# Patient Record
Sex: FEMALE | Race: WHITE | Employment: OTHER | URBAN - METROPOLITAN AREA
[De-identification: names, ages, dates, MRNs, and addresses within clinical notes are randomized per-mention and may not be internally consistent; named-entity substitution may affect disease eponyms.]

---

## 2017-03-20 ENCOUNTER — ANESTHESIA EVENT (OUTPATIENT)
Dept: ENDOSCOPY | Age: 62
End: 2017-03-20
Payer: COMMERCIAL

## 2017-03-20 RX ORDER — FENTANYL 50 UG/1
1 PATCH TRANSDERMAL
COMMUNITY
End: 2019-12-05

## 2017-03-21 ENCOUNTER — ANESTHESIA (OUTPATIENT)
Dept: ENDOSCOPY | Age: 62
End: 2017-03-21
Payer: COMMERCIAL

## 2017-03-21 ENCOUNTER — HOSPITAL ENCOUNTER (OUTPATIENT)
Age: 62
Setting detail: OUTPATIENT SURGERY
Discharge: HOME | End: 2017-03-21
Attending: INTERNAL MEDICINE | Admitting: INTERNAL MEDICINE
Payer: COMMERCIAL

## 2017-03-21 VITALS
SYSTOLIC BLOOD PRESSURE: 151 MMHG | BODY MASS INDEX: 32.57 KG/M2 | TEMPERATURE: 98.1 F | WEIGHT: 177 LBS | RESPIRATION RATE: 18 BRPM | HEIGHT: 62 IN | DIASTOLIC BLOOD PRESSURE: 83 MMHG | OXYGEN SATURATION: 95 % | HEART RATE: 73 BPM

## 2017-03-21 PROCEDURE — 76060000032 HC ANESTHESIA 0.5 TO 1 HR: Performed by: INTERNAL MEDICINE

## 2017-03-21 PROCEDURE — 74011250637 HC RX REV CODE- 250/637

## 2017-03-21 PROCEDURE — 74011250636 HC RX REV CODE- 250/636

## 2017-03-21 PROCEDURE — 77030003657 HC NDL SCLER BSC -B: Performed by: INTERNAL MEDICINE

## 2017-03-21 PROCEDURE — 76040000007: Performed by: INTERNAL MEDICINE

## 2017-03-21 PROCEDURE — 74011250636 HC RX REV CODE- 250/636: Performed by: NURSE ANESTHETIST, CERTIFIED REGISTERED

## 2017-03-21 RX ORDER — SODIUM CHLORIDE 0.9 % (FLUSH) 0.9 %
5-10 SYRINGE (ML) INJECTION EVERY 8 HOURS
Status: DISCONTINUED | OUTPATIENT
Start: 2017-03-21 | End: 2017-03-21 | Stop reason: HOSPADM

## 2017-03-21 RX ORDER — FAMOTIDINE 20 MG/1
TABLET, FILM COATED ORAL
Status: COMPLETED
Start: 2017-03-21 | End: 2017-03-21

## 2017-03-21 RX ORDER — SODIUM CHLORIDE, SODIUM LACTATE, POTASSIUM CHLORIDE, CALCIUM CHLORIDE 600; 310; 30; 20 MG/100ML; MG/100ML; MG/100ML; MG/100ML
50 INJECTION, SOLUTION INTRAVENOUS CONTINUOUS
Status: DISCONTINUED | OUTPATIENT
Start: 2017-03-22 | End: 2017-03-21 | Stop reason: HOSPADM

## 2017-03-21 RX ORDER — SODIUM CHLORIDE 0.9 % (FLUSH) 0.9 %
5-10 SYRINGE (ML) INJECTION AS NEEDED
Status: DISCONTINUED | OUTPATIENT
Start: 2017-03-21 | End: 2017-03-21 | Stop reason: HOSPADM

## 2017-03-21 RX ORDER — SODIUM CHLORIDE 9 MG/ML
50 INJECTION, SOLUTION INTRAVENOUS CONTINUOUS
Status: DISCONTINUED | OUTPATIENT
Start: 2017-03-21 | End: 2017-03-21 | Stop reason: HOSPADM

## 2017-03-21 RX ORDER — MAGNESIUM SULFATE 100 %
4 CRYSTALS MISCELLANEOUS AS NEEDED
Status: CANCELLED | OUTPATIENT
Start: 2017-03-21

## 2017-03-21 RX ORDER — ONDANSETRON 2 MG/ML
4 INJECTION INTRAMUSCULAR; INTRAVENOUS ONCE
Status: CANCELLED | OUTPATIENT
Start: 2017-03-21 | End: 2017-03-21

## 2017-03-21 RX ORDER — SODIUM CHLORIDE 0.9 % (FLUSH) 0.9 %
5-10 SYRINGE (ML) INJECTION AS NEEDED
Status: CANCELLED | OUTPATIENT
Start: 2017-03-21

## 2017-03-21 RX ORDER — DULOXETIN HYDROCHLORIDE 60 MG/1
60 CAPSULE, DELAYED RELEASE ORAL DAILY
COMMUNITY
End: 2019-12-05

## 2017-03-21 RX ORDER — FAMOTIDINE 20 MG/1
20 TABLET, FILM COATED ORAL ONCE
Status: COMPLETED | OUTPATIENT
Start: 2017-03-22 | End: 2017-03-21

## 2017-03-21 RX ORDER — DEXTROSE 50 % IN WATER (D50W) INTRAVENOUS SYRINGE
25-50 AS NEEDED
Status: CANCELLED | OUTPATIENT
Start: 2017-03-21

## 2017-03-21 RX ORDER — PROPOFOL 10 MG/ML
INJECTION, EMULSION INTRAVENOUS AS NEEDED
Status: DISCONTINUED | OUTPATIENT
Start: 2017-03-21 | End: 2017-03-21 | Stop reason: HOSPADM

## 2017-03-21 RX ORDER — DULOXETIN HYDROCHLORIDE 30 MG/1
30 CAPSULE, DELAYED RELEASE ORAL DAILY
COMMUNITY
End: 2019-12-05

## 2017-03-21 RX ORDER — PROPOFOL 10 MG/ML
INJECTION, EMULSION INTRAVENOUS
Status: DISCONTINUED | OUTPATIENT
Start: 2017-03-21 | End: 2017-03-21

## 2017-03-21 RX ORDER — INSULIN LISPRO 100 [IU]/ML
INJECTION, SOLUTION INTRAVENOUS; SUBCUTANEOUS ONCE
Status: CANCELLED | OUTPATIENT
Start: 2017-03-21 | End: 2017-03-21

## 2017-03-21 RX ADMIN — PROPOFOL 30 MG: 10 INJECTION, EMULSION INTRAVENOUS at 12:08

## 2017-03-21 RX ADMIN — PROPOFOL 30 MG: 10 INJECTION, EMULSION INTRAVENOUS at 12:06

## 2017-03-21 RX ADMIN — FAMOTIDINE 20 MG: 20 TABLET, FILM COATED ORAL at 11:30

## 2017-03-21 RX ADMIN — PROPOFOL 50 MG: 10 INJECTION, EMULSION INTRAVENOUS at 12:02

## 2017-03-21 RX ADMIN — SODIUM CHLORIDE, SODIUM LACTATE, POTASSIUM CHLORIDE, AND CALCIUM CHLORIDE 50 ML/HR: 600; 310; 30; 20 INJECTION, SOLUTION INTRAVENOUS at 11:26

## 2017-03-21 RX ADMIN — FAMOTIDINE 20 MG: 20 TABLET ORAL at 11:30

## 2017-03-21 RX ADMIN — PROPOFOL 50 MG: 10 INJECTION, EMULSION INTRAVENOUS at 12:03

## 2017-03-21 NOTE — ANESTHESIA POSTPROCEDURE EVALUATION
Post-Anesthesia Evaluation and Assessment    Patient: Ana Matthews MRN: [id number]  SSN: [id number]    YOB: 1955  Age: 64 y.o. Sex: female     VS from flow sheet    Cardiovascular Function/Vital Signs  Visit Vitals    /83 (BP 1 Location: Right arm, BP Patient Position: At rest)    Pulse 73    Temp 36.7 °C (98.1 °F)    Resp 18    Ht 5' 2\" (1.575 m)    Wt 80.3 kg (177 lb)    SpO2 95%    Breastfeeding No    BMI 32.37 kg/m2       Patient is status post MAC anesthesia for Procedure(s):  ESOPHAGOGASTRODUODENOSCOPY and botulium toxin injection. Nausea/Vomiting: None    Postoperative hydration reviewed and adequate. Pain:  Pain Scale 1: Numeric (0 - 10) (03/21/17 1251)  Pain Intensity 1: 0 (03/21/17 1251)   Managed    Neurological Status: At baseline    Mental Status and Level of Consciousness: Arousable    Pulmonary Status:   O2 Device: Room air (03/21/17 1220)   Adequate oxygenation and airway patent    Complications related to anesthesia: None    Post-anesthesia assessment completed.  No concerns    Signed By: Yuli Gold MD     March 21, 2017

## 2017-03-21 NOTE — PROCEDURES
egd      EGD Procedure Note    Patient: Leonora Nielsen MRN: [id number]  SSN: [id number]    YOB: 1955  Age: 64 y.o. Sex: female      Date of Procedure: 3/21/2017      Procedures:  EGD :    HISTORY UPDATE: History and physical has been reviewed. The patient has been examined. There have been no significant clinical changes since the completion of the originally dated History and Physical.    INDICATION: Achalasia     PROCEDURE PERFORMED: EGD    ENDOSCOPIST: Megha Mcdermott MD    ASSISTANT:  Endoscopy Technician-1: Venu Burgos  Endoscopy RN-1: Aristides Garza. Ankit Lane RN    CLASSIFICATION OF PREOPERATIVE RISK: ASA 2 - Patient with mild systemic disease with no functional limitations    ANESTHESIA:  MAC anesthesia    ENDOSCOPE: [id number]                                                                                                              EXTENT OF EXAM: Second portion of the duodenum      DESCRIPTION OF PROCEDURE:   The procedure was discussed with the patient including purpose, risks, benefits and alternatives including but not limited to IV conscious sedation, bleeding, perforation and aspiration and the consent form was signed and witnessed. A safety timeout was performed. Procedure done with MAC. The patients vital signs were monitored at all times including heart rate and rhythm, oxygen saturation, and blood pressure. The patient was then placed into the left lateral decubitus position. The Olympus adult diagnostic endoscope was then passed under direct visualization to the second portion of the duodenum. The endoscope was then slowly withdrawn while closely visualizing the mucosa. In the stomach a retroflexion was performed and gastric fundus and cardia visualized. The scope was then removed. The patient was then transferred to the recovery room. FINDINGS:   Esophagus: The esophageal mucosa was normal with no ulceration, mass or stricture.   There was no evidence of Rabago's esophagus or reflux esophagitis . Hypertensive LES, 25 U of Botox injected in four quadrants of LES 1 cm above the GE junction. Z line at 35 cm. Stomach: The gastric mucosa was normal with no ulceration, mass, stricture. S/P RNYGBP. Small bowel : The Small bowel mucosa  mucosa was normal with no ulceration, mass, stricture and no evidence of villous atrophy. EBL: None      SPECIMENS: None    IMPRESSION: Hypertensive LES, 25 U of Botox injected in four quadrants of LES 1 cm above the GE junction. Z line at 35 cm. Normal RNYGBP anatomy       PLAN 1: Discharge when sedation criteria are met. 2.  Resume regular Diet as tolerated. Follow Up:  As scheduled.       Susie Perkins MD  3/21/2017

## 2017-03-21 NOTE — H&P
History and Physical    Yamel Hubbard  1955  [id number]  [id number]    Pre-Procedure Diagnosis:  r13.10 dysphagia      Evaluation of past illnesses, surgeries, or injuries:   YES  Past Medical History:   Diagnosis Date    Arthritis     Back injury     june 2011    Back pain     Bell's palsy     Disorder of metabolism     states \"slow\"    Dysphagia     Esophageal stricture     states stretched 2x    GERD (gastroesophageal reflux disease)     Ill-defined condition     coddle block 2015    Leg pain, right     Lumbar stenosis     Migraines     Neck injury     Osteoarthritis     Shingles      Past Surgical History:   Procedure Laterality Date    HX CERVICAL FUSION      HX CHOLECYSTECTOMY      06/2005    HX GASTRIC BYPASS      3/27/2003    HX GI      HX GYN      HX LUMBAR LAMINECTOMY      HX ORTHOPAEDIC  05/23/2016    right foot surgery    HX PARTIAL HYSTERECTOMY      09/29/10       Allergies:  No Known Allergies    Previous reactions to sedation/analgesia? NO    Review of current medications, supplement, herbals and nutraceuticals complete:  YES  Current Facility-Administered Medications   Medication Dose Route Frequency Provider Last Rate Last Dose    [START ON 3/22/2017] lactated ringers infusion  50 mL/hr IntraVENous CONTINUOUS Susie Shaper, CRNA 50 mL/hr at 03/21/17 1126 50 mL/hr at 03/21/17 1126    0.9% sodium chloride infusion  50 mL/hr IntraVENous CONTINUOUS Yana Yu MD        sodium chloride (NS) flush 5-10 mL  5-10 mL IntraVENous Q8H Yana Yu MD        sodium chloride (NS) flush 5-10 mL  5-10 mL IntraVENous PRN Yana Yu MD        onabotulinumtoxinA (BOTOX) injection 100 Units  100 Units IntraMUSCular ONCE Yana Yu MD              Pertinent labs reviewed? YES    History of substance abuse?   NO  Family History   Problem Relation Age of Onset    Hypertension Mother     Diabetes Father     Hypertension Father     Heart Disease Father  Stroke Father     Lung Disease Father      copd     Social History     Social History    Marital status:      Spouse name: N/A    Number of children: N/A    Years of education: N/A     Occupational History    Not on file.      Social History Main Topics    Smoking status: Former Smoker     Quit date: 1/1/2000    Smokeless tobacco: Not on file    Alcohol use 0.5 oz/week     1 Standard drinks or equivalent per week      Comment: once a month    Drug use: No    Sexual activity: Not on file     Other Topics Concern    Not on file     Social History Narrative       Cardiac Status:  WNL    Mental Status:  WNL     Pulmonary Status:  WNL    NPO:  5-8    Assessment/Impression: Achalasia of esophagus     Plan of treatment: EGD with botox injection        Yana Yu MD  3/21/2017  11:45 AM

## 2017-03-21 NOTE — IP AVS SNAPSHOT
[address] [place] [address] [address] 
[phone] Patient: Svetlana Morataya MRN: [id number] :1955 You are allergic to the following No active allergies Recent Documentation Height Weight Breastfeeding? BMI OB Status Smoking Status 1.575 m 80.3 kg No 32.37 kg/m2 Hysterectomy Former Smoker Emergency Contacts Name Discharge Info Relation Home Work Mobile [facility] DISCHARGE CAREGIVER [3] Spouse [3] [phone] Radha Rosado N/A  AT THIS TIME [6] Daughter [21] [phone] Kirill Hackett  Daughter [21] [phone] About your hospitalization You were admitted on:  2017 [facility] You were discharged on:  2017 Unit phone number:  [phone] Why you were hospitalized Your primary diagnosis was:  Not on File Providers Seen During Your Hospitalizations Provider Role Specialty Primary office phone Akash Ross MD Attending Provider Gastroenterology [phone] Your Primary Care Physician (PCP) Primary Care Physician Office Phone Office Fax Julesdemetria Quinones [phone] Follow-up Information Follow up With Details Comments Contact Info James Teran MD   [address] [address] 
[phone] Venu Yu MD In 1 month  [address] [facility] [address] [id number] [phone] Current Discharge Medication List  
  
CONTINUE these medications which have NOT CHANGED Dose & Instructions Dispensing Information Comments Morning Noon Evening Bedtime  
 cyclobenzaprine 5 mg tablet Commonly known as:  FLEXERIL Your last dose was: Your next dose is:    
   
   
 Dose:  10 mg Take 10 mg by mouth nightly. Refills:  0 * CYMBALTA 60 mg capsule Generic drug:  DULoxetine Your last dose was: Your next dose is:    
   
   
 Dose:  60 mg Take 60 mg by mouth daily. Refills:  0  
     
   
   
   
  
 * CYMBALTA 30 mg capsule Generic drug:  DULoxetine Your last dose was: Your next dose is:    
   
   
 Dose:  30 mg Take 30 mg by mouth daily. Refills:  0  
     
   
   
   
  
 ergocalciferol 50,000 unit capsule Commonly known as:  ERGOCALCIFEROL Your last dose was: Your next dose is:    
   
   
 Dose:  37681 Units Take 50,000 Units by mouth every seven (7) days. Refills:  0  
     
   
   
   
  
 fentaNYL 50 mcg/hr PATCH Commonly known as:  Magan Lax Your last dose was: Your next dose is:    
   
   
 Dose:  1 Patch 1 Patch by TransDERmal route every seventy-two (72) hours. Refills:  0  
     
   
   
   
  
 gabapentin 100 mg capsule Commonly known as:  NEURONTIN Your last dose was: Your next dose is:    
   
   
 Dose:  300 mg Take 300 mg by mouth every evening. Refills:  0  
     
   
   
   
  
 metFORMIN 500 mg tablet Commonly known as:  GLUCOPHAGE Your last dose was: Your next dose is:    
   
   
 Dose:  500 mg Take 500 mg by mouth two (2) times daily (with meals). Takes for \"slow\" metabolism; pt to check with md if needed to be on hold prior to surgery Refills:  0 MULTIVITAMIN PO Your last dose was: Your next dose is:    
   
   
 Dose:  1 Tab Take 1 Tab by mouth daily. Uses gummy vit with calcium Refills:  0  
     
   
   
   
  
 nadolol 40 mg tablet Commonly known as:  CORGARD Your last dose was: Your next dose is:    
   
   
 Dose:  40 mg Take 40 mg by mouth every evening. Refills:  0  
     
   
   
   
  
 oxyCODONE IR 15 mg immediate release tablet Commonly known as:  OXY-IR Your last dose was: Your next dose is:    
   
   
 Dose:  15 mg Take 15 mg by mouth. States take 10 pills /day Refills:  0 phentermine 37.5 mg capsule Your last dose was: Your next dose is:    
   
   
 Dose:  37.5 mg Take 37.5 mg by mouth every morning. Pt instructed to check with md if needed to be onhold   prior to surgery Refills:  0 PROTONIX 40 mg tablet Generic drug:  pantoprazole Your last dose was: Your next dose is:    
   
   
 Dose:  40 mg Take 40 mg by mouth daily. Refills:  0  
     
   
   
   
  
 simvastatin 20 mg tablet Commonly known as:  ZOCOR Your last dose was: Your next dose is: Take  by mouth nightly. Refills:  0 VALTREX 500 mg tablet Generic drug:  valACYclovir Your last dose was: Your next dose is:    
   
   
 Dose:  1000 mg Take 1,000 mg by mouth every evening. Refills:  0  
     
   
   
   
  
 * Notice: This list has 2 medication(s) that are the same as other medications prescribed for you. Read the directions carefully, and ask your doctor or other care provider to review them with you. Discharge Instructions EGD DISCHARGE INSTRUCTIONS You have just had an examination of your esophagus (swallowing tube), stomach and duodenum (the first part of your small intestine) and of your colon (large intestine). The medication given to you during your procedure will be acting in your body for the next 12 hours, gradually wearing off. Your face may be flushed, skin may be warm and sweaty, you might feel a little bloated or nauseated and sleepy. 1. For the next 12 hours you SHOULD NOT: 
 
a. Drive, operate any machinery. b. Drink alcohol. 
c. Engage in activities that require mental sharpness or manual dexterity such as cooking. d. Take any medications other than prescribed by a physician. e. Make any legal or financial decisions. 2. Call this office immediately, if: 
 
a. Onset of new or increase of ever rectal bleeding. 
b. Fever > 101 
c. Increased abdominal pain Additional instructions: 
 
a. Diet as recommended 
b. Due to risk of dehydration after colon prep and sedation, avoid extended periods of time outdoors if the day is hot and humid. c. If biopsies were taken, you will receive a post card or telephone call with results of your biopsies and suggestion for your next colonoscopy. Please allow us at least 3 weeks to get back with you about your results. If we have not contacted you by the, please call us for results. Ph# [phone] 
d. If you had polyp(s) removed DO NOT TAKE NSAIDS (Aspirin, Advil, Aleve, Naproxyn, Ibuprofen, etc) for 2 weeks. Tylenol is OK to use. DISCHARGE SUMMARY from Nurse The following personal items are in your possession at time of discharge: 
 
Dental Appliances: None Visual Aid: Glasses PATIENT INSTRUCTIONS: 
 
After general anesthesia or intravenous sedation, for 24 hours or while taking prescription Narcotics: · Limit your activities · Do not drive and operate hazardous machinery · Do not make important personal or business decisions · Do  not drink alcoholic beverages · If you have not urinated within 8 hours after discharge, please contact your surgeon on call. Report the following to your surgeon: 
· Excessive pain, swelling, redness or odor of or around the surgical area · Temperature over 100.5 · Nausea and vomiting lasting longer than 4 hours or if unable to take medications · Any signs of decreased circulation or nerve impairment to extremity: change in color, persistent  numbness, tingling, coldness or increase pain · Any questions What to do at Home: These are general instructions for a healthy lifestyle: No smoking/ No tobacco products/ Avoid exposure to second hand smoke Surgeon General's Warning:  Quitting smoking now greatly reduces serious risk to your health. Obesity, smoking, and sedentary lifestyle greatly increases your risk for illness A healthy diet, regular physical exercise & weight monitoring are important for maintaining a healthy lifestyle You may be retaining fluid if you have a history of heart failure or if you experience any of the following symptoms:  Weight gain of 3 pounds or more overnight or 5 pounds in a week, increased swelling in our hands or feet or shortness of breath while lying flat in bed. Please call your doctor as soon as you notice any of these symptoms; do not wait until your next office visit. Recognize signs and symptoms of STROKE: 
 
F-face looks uneven A-arms unable to move or move unevenly S-speech slurred or non-existent T-time-call 911 as soon as signs and symptoms begin-DO NOT go Back to bed or wait to see if you get better-TIME IS BRAIN. Warning Signs of HEART ATTACK Call 911 if you have these symptoms: 
? Chest discomfort. Most heart attacks involve discomfort in the center of the chest that lasts more than a few minutes, or that goes away and comes back. It can feel like uncomfortable pressure, squeezing, fullness, or pain. ? Discomfort in other areas of the upper body. Symptoms can include pain or discomfort in one or both arms, the back, neck, jaw, or stomach. ? Shortness of breath with or without chest discomfort. ? Other signs may include breaking out in a cold sweat, nausea, or lightheadedness. Don't wait more than five minutes to call [address]! Fast action can save your life. Calling 911 is almost always the fastest way to get lifesaving treatment. Emergency Medical Services staff can begin treatment when they arrive  up to an hour sooner than if someone gets to the hospital by car. The discharge information has been reviewed with the patient. The patient verbalized understanding. Discharge medications reviewed with the patient and appropriate educational materials and side effects teaching were provided. Patient armband removed and given to patient to take home. Patient was informed of the privacy risks if armband lost or stolen Discharge Orders None Introducing [facility]! [employer] introduces Quidsi patient portal. Now you can access parts of your medical record, email your doctor's office, and request medication refills online. 1. In your internet browser, go to [email or web address] 2. Click on the First Time User? Click Here link in the Sign In box. You will see the New Member Sign Up page. 3. Enter your Quidsi Access Code exactly as it appears below. You will not need to use this code after youve completed the sign-up process. If you do not sign up before the expiration date, you must request a new code. · Quidsi Access Code: [email or web address] Expires: 5/22/2017  3:08 PM 
 
4. Enter the last four digits of your Social Security Number (xxxx) and Date of Birth (mm/dd/yyyy) as indicated and click Submit. You will be taken to the next sign-up page. 5. Create a Quidsi ID. This will be your Quidsi login ID and cannot be changed, so think of one that is secure and easy to remember. 6. Create a Quidsi password. You can change your password at any time. 7. Enter your Password Reset Question and Answer. This can be used at a later time if you forget your password. 8. Enter your e-mail address. You will receive e-mail notification when new information is available in [address] 9. Click Sign Up. You can now view and download portions of your medical record. 10. Click the Download Summary menu link to download a portable copy of your medical information. If you have questions, please visit the Frequently Asked Questions section of the MyChart website. Remember, MyChart is NOT to be used for urgent needs. For medical emergencies, dial 911. Now available from your iPhone and Android! General Information Please provide this summary of care documentation to your next provider. Patient Signature:  ____________________________________________________________ Date:  ____________________________________________________________  
  
[facility] Provider Signature:  ____________________________________________________________ Date:  ____________________________________________________________

## 2017-03-21 NOTE — DISCHARGE INSTRUCTIONS
EGD DISCHARGE INSTRUCTIONS    You have just had an examination of your esophagus (swallowing tube), stomach and duodenum (the first part of your small intestine) and of your colon (large intestine). The medication given to you during your procedure will be acting in your body for the next 12 hours, gradually wearing off. Your face may be flushed, skin may be warm and sweaty, you might feel a little bloated or nauseated and sleepy. 1. For the next 12 hours you SHOULD NOT:    a. Drive, operate any machinery. b. Drink alcohol.  c. Engage in activities that require mental sharpness or manual dexterity such as cooking. d. Take any medications other than prescribed by a physician.  e. Make any legal or financial decisions. 2. Call this office immediately, if:    a. Onset of new or increase of ever rectal bleeding.  b. Fever > 101  c. Increased abdominal pain    Additional instructions:    a. Diet as recommended  b. Due to risk of dehydration after colon prep and sedation, avoid extended periods of time outdoors if the day is hot and humid. c. If biopsies were taken, you will receive a post card or telephone call with results of your biopsies and suggestion for your next colonoscopy. Please allow us at least 3 weeks to get back with you about your results. If we have not contacted you by the, please call us for results. Ph# [phone]  d. If you had polyp(s) removed DO NOT TAKE NSAIDS (Aspirin, Advil, Aleve, Naproxyn, Ibuprofen, etc) for 2 weeks. Tylenol is OK to use.       DISCHARGE SUMMARY from Nurse    The following personal items are in your possession at time of discharge:    Dental Appliances: None  Visual Aid: Glasses                            PATIENT INSTRUCTIONS:    After general anesthesia or intravenous sedation, for 24 hours or while taking prescription Narcotics:  · Limit your activities  · Do not drive and operate hazardous machinery  · Do not make important personal or business decisions  · Do  not drink alcoholic beverages  · If you have not urinated within 8 hours after discharge, please contact your surgeon on call. Report the following to your surgeon:  · Excessive pain, swelling, redness or odor of or around the surgical area  · Temperature over 100.5  · Nausea and vomiting lasting longer than 4 hours or if unable to take medications  · Any signs of decreased circulation or nerve impairment to extremity: change in color, persistent  numbness, tingling, coldness or increase pain  · Any questions        What to do at Home:        These are general instructions for a healthy lifestyle:    No smoking/ No tobacco products/ Avoid exposure to second hand smoke    Surgeon General's Warning:  Quitting smoking now greatly reduces serious risk to your health. Obesity, smoking, and sedentary lifestyle greatly increases your risk for illness    A healthy diet, regular physical exercise & weight monitoring are important for maintaining a healthy lifestyle    You may be retaining fluid if you have a history of heart failure or if you experience any of the following symptoms:  Weight gain of 3 pounds or more overnight or 5 pounds in a week, increased swelling in our hands or feet or shortness of breath while lying flat in bed. Please call your doctor as soon as you notice any of these symptoms; do not wait until your next office visit. Recognize signs and symptoms of STROKE:    F-face looks uneven    A-arms unable to move or move unevenly    S-speech slurred or non-existent    T-time-call 911 as soon as signs and symptoms begin-DO NOT go       Back to bed or wait to see if you get better-TIME IS BRAIN. Warning Signs of HEART ATTACK     Call 911 if you have these symptoms:   Chest discomfort. Most heart attacks involve discomfort in the center of the chest that lasts more than a few minutes, or that goes away and comes back.  It can feel like uncomfortable pressure, squeezing, fullness, or pain.  Discomfort in other areas of the upper body. Symptoms can include pain or discomfort in one or both arms, the back, neck, jaw, or stomach.  Shortness of breath with or without chest discomfort.  Other signs may include breaking out in a cold sweat, nausea, or lightheadedness. Don't wait more than five minutes to call 911 - MINUTES MATTER! Fast action can save your life. Calling 911 is almost always the fastest way to get lifesaving treatment. Emergency Medical Services staff can begin treatment when they arrive -- up to an hour sooner than if someone gets to the hospital by car. The discharge information has been reviewed with the patient. The patient verbalized understanding. Discharge medications reviewed with the patient and appropriate educational materials and side effects teaching were provided. Patient armband removed and given to patient to take home.   Patient was informed of the privacy risks if armband lost or stolen

## 2017-03-21 NOTE — ANESTHESIA PREPROCEDURE EVALUATION
Anesthetic History   No history of anesthetic complications            Review of Systems / Medical History  Patient summary reviewed and pertinent labs reviewed    Pulmonary                   Neuro/Psych   Within defined limits           Cardiovascular                       GI/Hepatic/Renal     GERD           Endo/Other        Arthritis     Other Findings   Comments: Current Smoker? NO       Elective Surgery? Yes       Abstained from smoking 24 hours prior to anesthesia? N/A    Risk Factors for Postoperative nausea/vomiting:       History of postoperative nausea/vomiting? NO       Female? YES       Motion sickness? NO       Intended opioid administration for postoperative analgesia?   NO           Physical Exam    Airway  Mallampati: III  TM Distance: 4 - 6 cm  Neck ROM: decreased range of motion   Mouth opening: Diminished (comment)     Cardiovascular    Rhythm: irregular  Rate: normal         Dental    Dentition: Poor dentition     Pulmonary  Breath sounds clear to auscultation               Abdominal  GI exam deferred       Other Findings            Anesthetic Plan    ASA: 2  Anesthesia type: MAC            Anesthetic plan and risks discussed with: Patient

## 2017-09-05 ENCOUNTER — ANESTHESIA EVENT (OUTPATIENT)
Dept: ENDOSCOPY | Age: 62
End: 2017-09-05
Payer: COMMERCIAL

## 2017-09-06 ENCOUNTER — HOSPITAL ENCOUNTER (OUTPATIENT)
Age: 62
Setting detail: OUTPATIENT SURGERY
Discharge: HOME | End: 2017-09-06
Attending: INTERNAL MEDICINE | Admitting: INTERNAL MEDICINE
Payer: COMMERCIAL

## 2017-09-06 ENCOUNTER — ANESTHESIA (OUTPATIENT)
Dept: ENDOSCOPY | Age: 62
End: 2017-09-06
Payer: COMMERCIAL

## 2017-09-06 VITALS
TEMPERATURE: 97 F | HEART RATE: 85 BPM | DIASTOLIC BLOOD PRESSURE: 67 MMHG | RESPIRATION RATE: 16 BRPM | BODY MASS INDEX: 34.78 KG/M2 | WEIGHT: 189 LBS | HEIGHT: 62 IN | OXYGEN SATURATION: 92 % | SYSTOLIC BLOOD PRESSURE: 123 MMHG

## 2017-09-06 PROCEDURE — 74011250636 HC RX REV CODE- 250/636

## 2017-09-06 PROCEDURE — 77030003657 HC NDL SCLER BSC -B: Performed by: INTERNAL MEDICINE

## 2017-09-06 PROCEDURE — 74011000250 HC RX REV CODE- 250

## 2017-09-06 PROCEDURE — 74011250636 HC RX REV CODE- 250/636: Performed by: INTERNAL MEDICINE

## 2017-09-06 PROCEDURE — 76040000019: Performed by: INTERNAL MEDICINE

## 2017-09-06 PROCEDURE — 74011250636 HC RX REV CODE- 250/636: Performed by: NURSE ANESTHETIST, CERTIFIED REGISTERED

## 2017-09-06 PROCEDURE — 76060000031 HC ANESTHESIA FIRST 0.5 HR: Performed by: INTERNAL MEDICINE

## 2017-09-06 RX ORDER — OXYCODONE HYDROCHLORIDE 20 MG/1
TABLET ORAL
Refills: 0 | COMMUNITY
Start: 2017-07-04 | End: 2019-12-05

## 2017-09-06 RX ORDER — SODIUM CHLORIDE 0.9 % (FLUSH) 0.9 %
5-10 SYRINGE (ML) INJECTION AS NEEDED
Status: DISCONTINUED | OUTPATIENT
Start: 2017-09-06 | End: 2017-09-06 | Stop reason: HOSPADM

## 2017-09-06 RX ORDER — BUPROPION HYDROCHLORIDE 150 MG/1
TABLET, EXTENDED RELEASE ORAL
Refills: 3 | COMMUNITY
Start: 2017-07-30 | End: 2019-12-05

## 2017-09-06 RX ORDER — EFLORNITHINE HYDROCHLORIDE 139 MG/G
CREAM TOPICAL
Refills: 2 | COMMUNITY
Start: 2017-08-14 | End: 2019-12-05

## 2017-09-06 RX ORDER — LINACLOTIDE 290 UG/1
CAPSULE, GELATIN COATED ORAL
Refills: 0 | COMMUNITY
Start: 2017-08-09 | End: 2019-12-05

## 2017-09-06 RX ORDER — SODIUM CHLORIDE 0.9 % (FLUSH) 0.9 %
5-10 SYRINGE (ML) INJECTION EVERY 8 HOURS
Status: DISCONTINUED | OUTPATIENT
Start: 2017-09-06 | End: 2017-09-06 | Stop reason: HOSPADM

## 2017-09-06 RX ORDER — PROPOFOL 10 MG/ML
INJECTION, EMULSION INTRAVENOUS
Status: DISCONTINUED | OUTPATIENT
Start: 2017-09-06 | End: 2017-09-06 | Stop reason: HOSPADM

## 2017-09-06 RX ORDER — LACTULOSE 10 G/15ML
SOLUTION ORAL; RECTAL
Refills: 5 | Status: ON HOLD | COMMUNITY
Start: 2017-06-30 | End: 2017-09-06

## 2017-09-06 RX ORDER — NALOXEGOL OXALATE 25 MG/1
TABLET, FILM COATED ORAL
Refills: 5 | COMMUNITY
Start: 2017-07-11 | End: 2019-12-05

## 2017-09-06 RX ORDER — METRONIDAZOLE 7.5 MG/G
GEL TOPICAL
COMMUNITY
Start: 2016-02-26 | End: 2019-12-05

## 2017-09-06 RX ORDER — FAMOTIDINE 20 MG/1
20 TABLET, FILM COATED ORAL ONCE
Status: DISCONTINUED | OUTPATIENT
Start: 2017-09-07 | End: 2017-09-06 | Stop reason: HOSPADM

## 2017-09-06 RX ORDER — SODIUM CHLORIDE 9 MG/ML
25 INJECTION, SOLUTION INTRAVENOUS CONTINUOUS
Status: CANCELLED | OUTPATIENT
Start: 2017-09-06 | End: 2017-09-06

## 2017-09-06 RX ORDER — PROPOFOL 10 MG/ML
INJECTION, EMULSION INTRAVENOUS AS NEEDED
Status: DISCONTINUED | OUTPATIENT
Start: 2017-09-06 | End: 2017-09-06 | Stop reason: HOSPADM

## 2017-09-06 RX ORDER — SODIUM CHLORIDE 0.9 % (FLUSH) 0.9 %
5-10 SYRINGE (ML) INJECTION AS NEEDED
Status: CANCELLED | OUTPATIENT
Start: 2017-09-06 | End: 2017-09-06

## 2017-09-06 RX ORDER — SODIUM CHLORIDE 9 MG/ML
INJECTION INTRAMUSCULAR; INTRAVENOUS; SUBCUTANEOUS
Status: DISCONTINUED
Start: 2017-09-06 | End: 2017-09-06 | Stop reason: HOSPADM

## 2017-09-06 RX ORDER — SODIUM CHLORIDE, SODIUM LACTATE, POTASSIUM CHLORIDE, CALCIUM CHLORIDE 600; 310; 30; 20 MG/100ML; MG/100ML; MG/100ML; MG/100ML
50 INJECTION, SOLUTION INTRAVENOUS CONTINUOUS
Status: DISCONTINUED | OUTPATIENT
Start: 2017-09-07 | End: 2017-09-06 | Stop reason: HOSPADM

## 2017-09-06 RX ORDER — LIDOCAINE HYDROCHLORIDE 20 MG/ML
INJECTION, SOLUTION EPIDURAL; INFILTRATION; INTRACAUDAL; PERINEURAL AS NEEDED
Status: DISCONTINUED | OUTPATIENT
Start: 2017-09-06 | End: 2017-09-06 | Stop reason: HOSPADM

## 2017-09-06 RX ORDER — SODIUM CHLORIDE 0.9 % (FLUSH) 0.9 %
5-10 SYRINGE (ML) INJECTION EVERY 8 HOURS
Status: CANCELLED | OUTPATIENT
Start: 2017-09-06 | End: 2017-09-06

## 2017-09-06 RX ADMIN — ONABOTULINUMTOXINA 100 UNITS: 100 INJECTION, POWDER, LYOPHILIZED, FOR SOLUTION INTRADERMAL; INTRAMUSCULAR at 14:18

## 2017-09-06 RX ADMIN — PROPOFOL 200 MCG/KG/MIN: 10 INJECTION, EMULSION INTRAVENOUS at 14:12

## 2017-09-06 RX ADMIN — SODIUM CHLORIDE, SODIUM LACTATE, POTASSIUM CHLORIDE, AND CALCIUM CHLORIDE 50 ML/HR: 600; 310; 30; 20 INJECTION, SOLUTION INTRAVENOUS at 13:25

## 2017-09-06 RX ADMIN — PROPOFOL 50 MG: 10 INJECTION, EMULSION INTRAVENOUS at 14:12

## 2017-09-06 RX ADMIN — LIDOCAINE HYDROCHLORIDE 40 MG: 20 INJECTION, SOLUTION EPIDURAL; INFILTRATION; INTRACAUDAL; PERINEURAL at 14:12

## 2017-09-06 NOTE — ANESTHESIA PREPROCEDURE EVALUATION
Anesthetic History   No history of anesthetic complications            Review of Systems / Medical History  Patient summary reviewed and pertinent labs reviewed    Pulmonary  Within defined limits                 Neuro/Psych   Within defined limits           Cardiovascular                  Exercise tolerance: >4 METS     GI/Hepatic/Renal  Within defined limits   GERD           Endo/Other        Obesity and arthritis     Other Findings   Comments:   Risk Factors for Postoperative nausea/vomiting:       History of postoperative nausea/vomiting? NO       Female? YES       Motion sickness? NO       Intended opioid administration for postoperative analgesia? NO      Smoking Abstinence  Current Smoker? NO  Elective Surgery? YES  Seen preoperatively by anesthesiologist or proxy prior to day of surgery? YES  Pt abstained from smoking 24 hours prior to anesthesia?  YES               Physical Exam    Airway  Mallampati: II  TM Distance: 4 - 6 cm  Neck ROM: decreased range of motion   Mouth opening: Normal     Cardiovascular    Rhythm: regular  Rate: normal         Dental  No notable dental hx       Pulmonary  Breath sounds clear to auscultation               Abdominal  GI exam deferred       Other Findings            Anesthetic Plan    ASA: 3  Anesthesia type: MAC          Induction: Intravenous  Anesthetic plan and risks discussed with: Patient

## 2017-09-06 NOTE — PROCEDURES
EGD Procedure Note    Patient: Chaz Neumann MRN: [id number]  SSN: [id number]    YOB: 1955  Age: 64 y.o. Sex: female      Date of Procedure: 9/6/2017      Procedures:  EGD :    HISTORY UPDATE: History and physical has been reviewed. The patient has been examined. There have been no significant clinical changes since the completion of the originally dated History and Physical.    INDICATION:  Achalasia of esopghagus    PROCEDURE PERFORMED: EGD    ENDOSCOPIST: Candido Chin MD    ASSISTANT:  Endoscopy Technician-1: Jeff Blas  Endoscopy RN-1: Brittanie Milian RN    CLASSIFICATION OF PREOPERATIVE RISK: ASA 2 - Patient with mild systemic disease with no functional limitations    ANESTHESIA:  MAC anesthesia    ENDOSCOPE: [id number]                                                                                                              EXTENT OF EXAM: Second portion of the duodenum      DESCRIPTION OF PROCEDURE:   The procedure was discussed with the patient including purpose, risks, benefits and alternatives including but not limited to IV conscious sedation, bleeding, perforation and aspiration and the consent form was signed and witnessed. A safety timeout was performed. Procedure done with MAC. The patients vital signs were monitored at all times including heart rate and rhythm, oxygen saturation, and blood pressure. The patient was then placed into the left lateral decubitus position. The Olympus adult diagnostic endoscope was then passed under direct visualization to the second portion of the duodenum. The endoscope was then slowly withdrawn while closely visualizing the mucosa. In the stomach a retroflexion was performed and gastric fundus and cardia visualized. The scope was then removed. The patient was then transferred to the recovery room. FINDINGS:   Esophagus: The esophageal mucosa was normal with no ulceration, mass or stricture.   There was no evidence of Rabago's esophagus or reflux esophagitis. 25 U of botox injected into four quadrants of the lower esophagus 1 cm above the GE junction. Z line at 35 cm. Stomach: S/P RNYGBP. The gastric pouch mucosa was normal with no ulceration, mass, stricture. Anastamosis looks normal     Small bowel : The Small bowel  mucosa was normal with no ulceration, mass. Stricture up to 50 cm. EBL: None      SPECIMENS: None    IMPRESSION: Normal RNYGBP anatomy. 25 U of botox injected into four quadrants of the lower esophagus 1 cm above the GE junction. Z line at 35 cm. PLAN 1: Discharge when sedation criteria are met. 2.  Resume regular Diet as tolerated. Follow Up:  As scheduled.       Reyes Chough, MD  9/6/2017

## 2017-09-06 NOTE — IP AVS SNAPSHOT
[address] [address] [place] 
[phone] Patient: Farideh Vo MRN: [id number] :1955 You are allergic to the following No active allergies Recent Documentation Height Weight BMI OB Status Smoking Status 1.575 m 85.7 kg 34.57 kg/m2 Hysterectomy Former Smoker Emergency Contacts Name Discharge Info Relation Home Work Mobile [facility] DISCHARGE CAREGIVER [3] Spouse [3] [phone] Radha Rosado N/A  AT THIS TIME [6] Daughter [21] [phone] Urbano Jones  Daughter [21] [phone] About your hospitalization You were admitted on:  2017 [facility] You were discharged on:  2017 Unit phone number:  [phone] Why you were hospitalized Your primary diagnosis was:  Not on File Providers Seen During Your Hospitalizations Provider Role Specialty Primary office phone Karlene Barrett MD Attending Provider Gastroenterology [phone] Your Primary Care Physician (PCP) Primary Care Physician Office Phone Office Fax Key [place]lin [phone] Follow-up Information Follow up With Details Comments Contact Info Liliana Montanez MD   [address] [place] [address] [address] [address] 
[phone] Alexus Yu MD In 2 months  [facility] 
[address] [address] [phone] Current Discharge Medication List  
  
CONTINUE these medications which have NOT CHANGED Dose & Instructions Dispensing Information Comments Morning Noon Evening Bedtime buPROPion  mg SR tablet Commonly known as:  Suzy Mclain Your last dose was: Your next dose is:  TAKE 1 TABLET BY MOUTH EVERY MORNING FOR 3 DAYS AND THEN 1 TABLET BY MOUTH TWICE A DAY Refills:  3 CALCIUM PO Your last dose was: Your next dose is: Take  by Mouth. Refills:  0  
     
   
   
   
  
 cyclobenzaprine 5 mg tablet Commonly known as:  FLEXERIL Your last dose was: Your next dose is:    
   
   
 Dose:  10 mg Take 10 mg by mouth nightly. Refills:  0  
     
   
   
   
  
 * CYMBALTA 60 mg capsule Generic drug:  DULoxetine Your last dose was: Your next dose is:    
   
   
 Dose:  60 mg Take 60 mg by mouth daily. Refills:  0  
     
   
   
   
  
 * CYMBALTA 30 mg capsule Generic drug:  DULoxetine Your last dose was: Your next dose is:    
   
   
 Dose:  30 mg Take 30 mg by mouth daily. Refills:  0  
     
   
   
   
  
 ergocalciferol 50,000 unit capsule Commonly known as:  ERGOCALCIFEROL Your last dose was: Your next dose is:    
   
   
 Dose:  37994 Units Take 50,000 Units by mouth every seven (7) days. Refills:  0  
     
   
   
   
  
 fentaNYL 50 mcg/hr PATCH Commonly known as:  Jennifer Aylin Your last dose was: Your next dose is:    
   
   
 Dose:  1 Patch 1 Patch by TransDERmal route every seventy-two (72) hours. Refills:  0  
     
   
   
   
  
 gabapentin 100 mg capsule Commonly known as:  NEURONTIN Your last dose was: Your next dose is:    
   
   
 Dose:  300 mg Take 300 mg by mouth every evening. Refills:  0 LINZESS 290 mcg Cap capsule Generic drug:  linaclotide Your last dose was: Your next dose is: TAKE 1 CAPSULE ORALLY DAILY FOR CONSTIPATION. Refills:  0  
     
   
   
   
  
 metroNIDAZOLE 0.75 % topical gel Commonly known as:  Lunda Achilles Your last dose was: Your next dose is:    
   
   
 Use 1 Application to affected area Twice Daily. Refills:  0 MOVANTIK 25 mg Tab tablet Generic drug:  naloxegol Your last dose was: Your next dose is: TAKE ONE TABLET BY MOUTH DAILY FOR CONSTIPATION *NONFORMULARY CONTACTING MD  
 Refills:  5 MULTIVITAMIN PO Your last dose was: Your next dose is:    
   
   
 Dose:  1 Tab Take 1 Tab by mouth daily. Uses gummy vit with calcium Refills:  0  
     
   
   
   
  
 nadolol 40 mg tablet Commonly known as:  CORGARD Your last dose was: Your next dose is:    
   
   
 Dose:  40 mg Take 40 mg by mouth every evening. Refills:  0  
     
   
   
   
  
 * oxyCODONE IR 15 mg immediate release tablet Commonly known as:  OXY-IR Your last dose was: Your next dose is:    
   
   
 Dose:  15 mg Take 15 mg by mouth. States take 10 pills /day Refills:  0  
     
   
   
   
  
 * oxyCODONE IR 20 mg immediate release tablet Commonly known as:  Paul Cano Your last dose was: Your next dose is: TAKE ONE TABLET BY MOUTH 4 TIMES DAILY AS NEEDED FOR BREAKTHROUGH PAIN Refills:  0 PROTONIX 40 mg tablet Generic drug:  pantoprazole Your last dose was: Your next dose is:    
   
   
 Dose:  40 mg Take 40 mg by mouth daily. Refills:  0  
     
   
   
   
  
 simvastatin 20 mg tablet Commonly known as:  ZOCOR Your last dose was: Your next dose is: Take  by mouth nightly. Refills:  0 VALTREX 500 mg tablet Generic drug:  valACYclovir Your last dose was: Your next dose is:    
   
   
 Dose:  1000 mg Take 1,000 mg by mouth every evening. Refills:  0  
     
   
   
   
  
 VANIQA 13.9 % topical cream  
Generic drug:  eflornithine Your last dose was: Your next dose is:    
   
   
 USE 1 APPLICAION TO AFFECTED AREA TWICE DAILY FOR INCREASED FACIAL HAIR. Refills:  2 VITAMIN B COMPLEX PO Your last dose was: Your next dose is: Take  by Mouth. Refills:  0  
     
   
   
   
  
 * Notice: This list has 4 medication(s) that are the same as other medications prescribed for you. Read the directions carefully, and ask your doctor or other care provider to review them with you. Discharge Instructions EGD DISCHARGE INSTRUCTIONS You have just had an examination of your esophagus (swallowing tube), stomach and duodenum (the first part of your small intestine) and of your colon (large intestine). The medication given to you during your procedure will be acting in your body for the next 12 hours, gradually wearing off. Your face may be flushed, skin may be warm and sweaty, you might feel a little bloated or nauseated and sleepy. 1. For the next 12 hours you SHOULD NOT: 
 
a. Drive, operate any machinery. b. Drink alcohol. 
c. Engage in activities that require mental sharpness or manual dexterity such as cooking. d. Take any medications other than prescribed by a physician. 
e. Make any legal or financial decisions. 2. Call this office immediately, if: 
 
a. Onset of new or increase of ever rectal bleeding. 
b. Fever > 101 
c. Increased abdominal pain Additional instructions: 
 
a. Diet as recommended 
b. Due to risk of dehydration after colon prep and sedation, avoid extended periods of time outdoors if the day is hot and humid. c. If biopsies were taken, you will receive a post card or telephone call with results of your biopsies and suggestion for your next colonoscopy. Please allow us at least 3 weeks to get back with you about your results. If we have not contacted you by the, please call us for results. Ph# [phone] 
d. If you had polyp(s) removed DO NOT TAKE NSAIDS (Aspirin, Advil, Aleve, Naproxyn, Ibuprofen, etc) for 2 weeks. Tylenol is OK to use. DISCHARGE SUMMARY from Nurse The following personal items are in your possession at time of discharge: 
 
Dental Appliances: None Visual Aid: None PATIENT INSTRUCTIONS: 
 
After general anesthesia or intravenous sedation, for 24 hours or while taking prescription Narcotics: · Limit your activities · Do not drive and operate hazardous machinery · Do not make important personal or business decisions · Do  not drink alcoholic beverages · If you have not urinated within 8 hours after discharge, please contact your surgeon on call. Report the following to your surgeon: 
· Excessive pain, swelling, redness or odor of or around the surgical area · Temperature over 100.5 · Nausea and vomiting lasting longer than 4 hours or if unable to take medications · Any signs of decreased circulation or nerve impairment to extremity: change in color, persistent  numbness, tingling, coldness or increase pain · Any questions What to do at Home: These are general instructions for a healthy lifestyle: No smoking/ No tobacco products/ Avoid exposure to second hand smoke Surgeon General's Warning:  Quitting smoking now greatly reduces serious risk to your health. Obesity, smoking, and sedentary lifestyle greatly increases your risk for illness A healthy diet, regular physical exercise & weight monitoring are important for maintaining a healthy lifestyle You may be retaining fluid if you have a history of heart failure or if you experience any of the following symptoms:  Weight gain of 3 pounds or more overnight or 5 pounds in a week, increased swelling in our hands or feet or shortness of breath while lying flat in bed. Please call your doctor as soon as you notice any of these symptoms; do not wait until your next office visit. Recognize signs and symptoms of STROKE: 
 
F-face looks uneven A-arms unable to move or move unevenly S-speech slurred or non-existent T-time-call 911 as soon as signs and symptoms begin-DO NOT go Back to bed or wait to see if you get better-TIME IS BRAIN. Warning Signs of HEART ATTACK Call 911 if you have these symptoms: 
? Chest discomfort. Most heart attacks involve discomfort in the center of the chest that lasts more than a few minutes, or that goes away and comes back. It can feel like uncomfortable pressure, squeezing, fullness, or pain. ? Discomfort in other areas of the upper body. Symptoms can include pain or discomfort in one or both arms, the back, neck, jaw, or stomach. ? Shortness of breath with or without chest discomfort. ? Other signs may include breaking out in a cold sweat, nausea, or lightheadedness. Don't wait more than five minutes to call [address]! Fast action can save your life. Calling 911 is almost always the fastest way to get lifesaving treatment. Emergency Medical Services staff can begin treatment when they arrive  up to an hour sooner than if someone gets to the hospital by car. The discharge information has been reviewed with the patient. The patient verbalized understanding. Discharge medications reviewed with the patient and appropriate educational materials and side effects teaching were provided. Patient armband removed and given to patient to take home. Patient was informed of the privacy risks if armband lost or stolen Discharge Orders None Introducing [facility]! Rachel Bedolla introduces [employer] patient portal. Now you can access parts of your medical record, email your doctor's office, and request medication refills online. 1. In your internet browser, go to [email or web address] 2. Click on the First Time User? Click Here link in the Sign In box. You will see the New Member Sign Up page. 3. Enter your [employer] Access Code exactly as it appears below.  You will not need to use this code after youve completed the sign-up process. If you do not sign up before the expiration date, you must request a new code. · [employer] Access Code: [email or web address] Expires: 12/5/2017 11:36 AM 
 
4. Enter the last four digits of your Social Security Number (xxxx) and Date of Birth (mm/dd/yyyy) as indicated and click Submit. You will be taken to the next sign-up page. 5. Create a [employer] ID. This will be your [employer] login ID and cannot be changed, so think of one that is secure and easy to remember. 6. Create a [employer] password. You can change your password at any time. 7. Enter your Password Reset Question and Answer. This can be used at a later time if you forget your password. 8. Enter your e-mail address. You will receive e-mail notification when new information is available in [address] 9. Click Sign Up. You can now view and download portions of your medical record. 10. Click the Download Summary menu link to download a portable copy of your medical information. If you have questions, please visit the Frequently Asked Questions section of the [employer] website. Remember, [employer] is NOT to be used for urgent needs. For medical emergencies, dial 911. Now available from your iPhone and Android! General Information Please provide this summary of care documentation to your next provider. Patient Signature:  ____________________________________________________________ Date:  ____________________________________________________________  
  
Ivis Mary Provider Signature:  ____________________________________________________________ Date:  ____________________________________________________________

## 2017-09-06 NOTE — ANESTHESIA POSTPROCEDURE EVALUATION
Post-Anesthesia Evaluation and Assessment    Patient: Benjamin Baird MRN: [id number]  SSN: [id number]    YOB: 1955  Age: 64 y.o. Sex: female      Data from PACU flowsheet    Cardiovascular Function/Vital Signs  Visit Vitals    /67    Pulse 85    Temp 36.1 °C (97 °F)    Resp 16    Ht 5' 2\" (1.575 m)    Wt 85.7 kg (189 lb)    SpO2 92%    BMI 34.57 kg/m2       Patient is status post MAC anesthesia for Procedure(s):  ESOPHAGOGASTRODUODENOSCOPY w/botox. Nausea/Vomiting: controlled    Postoperative hydration reviewed and adequate. Pain:  Pain Scale 1: Numeric (0 - 10) (09/06/17 1256)  Pain Intensity 1: 4 (09/06/17 1256)   Managed      Mental Status and Level of Consciousness: Alert and oriented     Pulmonary Status:   O2 Device: Room air (09/06/17 1431)   Adequate oxygenation and airway patent    Complications related to anesthesia: None    Post-anesthesia assessment completed.  No concerns    Signed By: Radha Lilly MD     September 6, 2017

## 2017-09-06 NOTE — H&P
Date of Surgery Update:  Roberta Jones was seen and examined. History and physical has been reviewed. The patient has been examined.  There have been no significant clinical changes since the completion of the originally dated History and Physical.    Signed By: Bartolo Jenkins MD     September 6, 2017 1:02 PM

## 2017-09-06 NOTE — DISCHARGE INSTRUCTIONS
EGD DISCHARGE INSTRUCTIONS    You have just had an examination of your esophagus (swallowing tube), stomach and duodenum (the first part of your small intestine) and of your colon (large intestine). The medication given to you during your procedure will be acting in your body for the next 12 hours, gradually wearing off. Your face may be flushed, skin may be warm and sweaty, you might feel a little bloated or nauseated and sleepy. 1. For the next 12 hours you SHOULD NOT:    a. Drive, operate any machinery. b. Drink alcohol.  c. Engage in activities that require mental sharpness or manual dexterity such as cooking. d. Take any medications other than prescribed by a physician.  e. Make any legal or financial decisions. 2. Call this office immediately, if:    a. Onset of new or increase of ever rectal bleeding.  b. Fever > 101  c. Increased abdominal pain    Additional instructions:    a. Diet as recommended  b. Due to risk of dehydration after colon prep and sedation, avoid extended periods of time outdoors if the day is hot and humid. c. If biopsies were taken, you will receive a post card or telephone call with results of your biopsies and suggestion for your next colonoscopy. Please allow us at least 3 weeks to get back with you about your results. If we have not contacted you by the, please call us for results. Ph# [phone]  d. If you had polyp(s) removed DO NOT TAKE NSAIDS (Aspirin, Advil, Aleve, Naproxyn, Ibuprofen, etc) for 2 weeks. Tylenol is OK to use.           DISCHARGE SUMMARY from Nurse    The following personal items are in your possession at time of discharge:    Dental Appliances: None  Visual Aid: None                            PATIENT INSTRUCTIONS:    After general anesthesia or intravenous sedation, for 24 hours or while taking prescription Narcotics:  · Limit your activities  · Do not drive and operate hazardous machinery  · Do not make important personal or business decisions  · Do  not drink alcoholic beverages  · If you have not urinated within 8 hours after discharge, please contact your surgeon on call. Report the following to your surgeon:  · Excessive pain, swelling, redness or odor of or around the surgical area  · Temperature over 100.5  · Nausea and vomiting lasting longer than 4 hours or if unable to take medications  · Any signs of decreased circulation or nerve impairment to extremity: change in color, persistent  numbness, tingling, coldness or increase pain  · Any questions        What to do at Home:  These are general instructions for a healthy lifestyle:    No smoking/ No tobacco products/ Avoid exposure to second hand smoke    Surgeon General's Warning:  Quitting smoking now greatly reduces serious risk to your health. Obesity, smoking, and sedentary lifestyle greatly increases your risk for illness    A healthy diet, regular physical exercise & weight monitoring are important for maintaining a healthy lifestyle    You may be retaining fluid if you have a history of heart failure or if you experience any of the following symptoms:  Weight gain of 3 pounds or more overnight or 5 pounds in a week, increased swelling in our hands or feet or shortness of breath while lying flat in bed. Please call your doctor as soon as you notice any of these symptoms; do not wait until your next office visit. Recognize signs and symptoms of STROKE:    F-face looks uneven    A-arms unable to move or move unevenly    S-speech slurred or non-existent    T-time-call 911 as soon as signs and symptoms begin-DO NOT go       Back to bed or wait to see if you get better-TIME IS BRAIN. Warning Signs of HEART ATTACK     Call 911 if you have these symptoms:   Chest discomfort. Most heart attacks involve discomfort in the center of the chest that lasts more than a few minutes, or that goes away and comes back.  It can feel like uncomfortable pressure, squeezing, fullness, or pain.  Discomfort in other areas of the upper body. Symptoms can include pain or discomfort in one or both arms, the back, neck, jaw, or stomach.  Shortness of breath with or without chest discomfort.  Other signs may include breaking out in a cold sweat, nausea, or lightheadedness. Don't wait more than five minutes to call 911 - MINUTES MATTER! Fast action can save your life. Calling 911 is almost always the fastest way to get lifesaving treatment. Emergency Medical Services staff can begin treatment when they arrive -- up to an hour sooner than if someone gets to the hospital by car. The discharge information has been reviewed with the patient. The patient verbalized understanding. Discharge medications reviewed with the patient and appropriate educational materials and side effects teaching were provided. Patient armband removed and given to patient to take home.   Patient was informed of the privacy risks if armband lost or stolen

## 2021-05-28 ENCOUNTER — IMPORTED ENCOUNTER (OUTPATIENT)
Dept: URBAN - METROPOLITAN AREA CLINIC 1 | Facility: CLINIC | Age: 66
End: 2021-05-28

## 2021-05-28 PROBLEM — H04.123: Noted: 2021-05-28

## 2021-05-28 PROBLEM — H18.413: Noted: 2021-05-28

## 2021-05-28 PROBLEM — H16.143: Noted: 2021-05-28

## 2021-05-28 PROBLEM — H25.813: Noted: 2021-05-28

## 2021-05-28 PROCEDURE — 92015 DETERMINE REFRACTIVE STATE: CPT

## 2021-05-28 PROCEDURE — 99204 OFFICE O/P NEW MOD 45 MIN: CPT

## 2021-05-28 NOTE — PATIENT DISCUSSION
() Family Hx Glaucoma (Mother) MRx for glasses given to patient. Return for an appointment in 1 year 30/Glare with Dr. Sourav Hallman.

## 2021-05-28 NOTE — PATIENT DISCUSSION
1.  Cataract OU -- Non-surgical at this time continue to monitor for progression. The patient was advised to contact us if any change or worsening of vision. 2.  JEANNE w/ PEK OU -- Recommended ATs TID OU routinely. 3. Jose Luis Yeh.  () Family Hx Glaucoma (Mother) MRx for glasses given to patient. Return for an appointment in 1 year 30/Glare with Dr. Victoria Perry.

## 2022-04-03 ASSESSMENT — VISUAL ACUITY
OS_SC: 20/30
OD_SC: J7
OS_CC: 20/60+1
OD_CC: J3
OS_CC: J3
OD_CC: 20/40
OD_SC: 20/20-1
OS_SC: J7

## 2022-04-03 ASSESSMENT — TONOMETRY
OD_IOP_MMHG: 16
OS_IOP_MMHG: 16

## 2022-04-03 ASSESSMENT — KERATOMETRY
OD_K1POWER_DIOPTERS: 47.25
OS_AXISANGLE_DEGREES: 005
OS_K2POWER_DIOPTERS: 47.00
OD_AXISANGLE_DEGREES: 168
OS_AXISANGLE2_DEGREES: 095
OD_K2POWER_DIOPTERS: 47.50
OS_K1POWER_DIOPTERS: 46.50
OD_AXISANGLE2_DEGREES: 078

## 2022-05-02 PROBLEM — R10.13 EPIGASTRIC PAIN: Status: ACTIVE | Noted: 2022-05-02

## 2022-08-04 ENCOUNTER — COMPREHENSIVE EXAM (OUTPATIENT)
Dept: URBAN - METROPOLITAN AREA CLINIC 1 | Facility: CLINIC | Age: 67
End: 2022-08-04

## 2022-08-04 DIAGNOSIS — H04.123: ICD-10-CM

## 2022-08-04 DIAGNOSIS — H25.813: ICD-10-CM

## 2022-08-04 DIAGNOSIS — H18.413: ICD-10-CM

## 2022-08-04 DIAGNOSIS — H16.143: ICD-10-CM

## 2022-08-04 PROCEDURE — 92015 DETERMINE REFRACTIVE STATE: CPT

## 2022-08-04 PROCEDURE — 99214 OFFICE O/P EST MOD 30 MIN: CPT

## 2022-08-04 ASSESSMENT — KERATOMETRY
OD_K1POWER_DIOPTERS: 47.25
OS_AXISANGLE_DEGREES: 005
OS_K2POWER_DIOPTERS: 47.00
OD_AXISANGLE_DEGREES: 168
OD_K2POWER_DIOPTERS: 47.50
OD_AXISANGLE2_DEGREES: 078
OS_K1POWER_DIOPTERS: 46.50
OS_AXISANGLE2_DEGREES: 095

## 2022-08-04 ASSESSMENT — TONOMETRY
OS_IOP_MMHG: 17
OD_IOP_MMHG: 17

## 2022-08-04 ASSESSMENT — VISUAL ACUITY
OD_BAT: 20/100
OS_CC: J1
OD_CC: 20/25
OS_BAT: 20/100
OD_CC: J1
OS_CC: 20/30

## 2023-08-11 ENCOUNTER — COMPREHENSIVE EXAM (OUTPATIENT)
Dept: URBAN - METROPOLITAN AREA CLINIC 2 | Facility: CLINIC | Age: 68
End: 2023-08-11

## 2023-08-11 DIAGNOSIS — H04.123: ICD-10-CM

## 2023-08-11 DIAGNOSIS — H18.413: ICD-10-CM

## 2023-08-11 DIAGNOSIS — H16.143: ICD-10-CM

## 2023-08-11 DIAGNOSIS — H25.813: ICD-10-CM

## 2023-08-11 PROCEDURE — 92014 COMPRE OPH EXAM EST PT 1/>: CPT

## 2023-08-11 ASSESSMENT — VISUAL ACUITY
OS_CC: 20/30
OD_CC: 20/25
OD_CC: J1
OS_BAT: 20/40
OD_BAT: 20/30
OS_CC: J1

## 2023-08-11 ASSESSMENT — TONOMETRY
OS_IOP_MMHG: 14
OD_IOP_MMHG: 14

## 2023-08-11 ASSESSMENT — KERATOMETRY
OD_AXISANGLE_DEGREES: 168
OD_K2POWER_DIOPTERS: 47.50
OS_AXISANGLE_DEGREES: 005
OS_K1POWER_DIOPTERS: 46.50
OD_K1POWER_DIOPTERS: 47.25
OD_AXISANGLE2_DEGREES: 078
OS_AXISANGLE2_DEGREES: 095
OS_K2POWER_DIOPTERS: 47.00

## 2024-08-16 ENCOUNTER — COMPREHENSIVE EXAM (OUTPATIENT)
Dept: URBAN - METROPOLITAN AREA CLINIC 2 | Facility: CLINIC | Age: 69
End: 2024-08-16

## 2024-08-16 DIAGNOSIS — H25.813: ICD-10-CM

## 2024-08-16 DIAGNOSIS — H35.372: ICD-10-CM

## 2024-08-16 DIAGNOSIS — H02.885: ICD-10-CM

## 2024-08-16 DIAGNOSIS — H02.882: ICD-10-CM

## 2024-08-16 DIAGNOSIS — H18.413: ICD-10-CM

## 2024-08-16 DIAGNOSIS — H04.123: ICD-10-CM

## 2024-08-16 PROCEDURE — 92014 COMPRE OPH EXAM EST PT 1/>: CPT

## 2024-08-16 ASSESSMENT — TONOMETRY
OS_IOP_MMHG: 14
OD_IOP_MMHG: 14

## 2024-08-16 ASSESSMENT — KERATOMETRY
OS_AXISANGLE_DEGREES: 005
OD_AXISANGLE_DEGREES: 168
OD_AXISANGLE2_DEGREES: 078
OS_AXISANGLE2_DEGREES: 095
OS_K2POWER_DIOPTERS: 47.00
OD_K2POWER_DIOPTERS: 47.50
OS_K1POWER_DIOPTERS: 46.50
OD_K1POWER_DIOPTERS: 47.25

## 2024-08-16 ASSESSMENT — VISUAL ACUITY
OS_CC: 20/20-2
OD_BAT: 20/20
OS_BAT: 20/20
OU_CC: 20/20
OD_CC: 20/20

## (undated) DEVICE — KENDALL 500 SERIES DIAPHORETIC FOAM MONITORING ELECTRODE - TEAR DROP SHAPE ( 30/PK): Brand: KENDALL

## (undated) DEVICE — KIT COLON W/ 1.1OZ LUB AND 2 END

## (undated) DEVICE — BASIN EMESIS 500CC ROSE 250/CS 60/PLT: Brand: MEDEGEN MEDICAL PRODUCTS, LLC

## (undated) DEVICE — SYR 50ML SLIP TIP NSAF LF STRL --

## (undated) DEVICE — NDL INJ SCLERO 25G 240CM -- INTERJECT M00518360 BX/5

## (undated) DEVICE — MEDI-VAC NON-CONDUCTIVE SUCTION TUBING: Brand: CARDINAL HEALTH

## (undated) DEVICE — FLEX ADVANTAGE 1500CC: Brand: FLEX ADVANTAGE

## (undated) DEVICE — BITE BLK ENDOSCP AD 54FR GRN POLYETH ENDOSCP W STRP SLD